# Patient Record
(demographics unavailable — no encounter records)

---

## 2025-01-29 NOTE — BEGINNING OF VISIT
Pt discharged home with parent/guardian. Pt acting age appropriately, respirations regular and unlabored, cap refill less than two seconds. Skin pink, dry and warm. Lungs clear bilaterally. No further complaints at this time. Parent/guardian verbalized understanding of discharge paperwork and has no further questions at this time. Education provided about continuation of care, follow up care and medication administration. Parent/guardian able to provided teach back about discharge instructions. [Mother] : mother

## 2025-07-15 NOTE — HISTORY OF PRESENT ILLNESS
[de-identified] : 10yr old f c/o frequent urination and hurts to urinate since this morning [FreeTextEntry6] : sudden onset of feeling stabbing pains in the bladder- cried alot due to this- normal activities this am, normal stooling, no dysuria but had some frequency -denies constipation  no vaginal issues-

## 2025-07-15 NOTE — DISCUSSION/SUMMARY
[FreeTextEntry1] : bladder spasm?? will r/o UTI , UA normal - if UC + give duricef 500 5mls bid x 10days  monitor next 24 hrs- seems a little better now- hurts or feels the stabbing feeling when walking- sitting she feels fine

## 2025-07-15 NOTE — PHYSICAL EXAM
[NL] : soft, nontender, nondistended, normal bowel sounds, no hepatosplenomegaly [Normal external genitalia] : normal external genitalia